# Patient Record
Sex: MALE | Race: ASIAN | NOT HISPANIC OR LATINO
[De-identification: names, ages, dates, MRNs, and addresses within clinical notes are randomized per-mention and may not be internally consistent; named-entity substitution may affect disease eponyms.]

---

## 2024-08-16 ENCOUNTER — RESULT REVIEW (OUTPATIENT)
Age: 64
End: 2024-08-16

## 2024-08-16 VITALS — WEIGHT: 149.91 LBS | BODY MASS INDEX: 23.53 KG/M2 | HEIGHT: 67.01 IN

## 2024-08-16 DIAGNOSIS — Z86.79 PERSONAL HISTORY OF OTHER DISEASES OF THE CIRCULATORY SYSTEM: ICD-10-CM

## 2024-08-16 DIAGNOSIS — I50.23 ACUTE ON CHRONIC SYSTOLIC (CONGESTIVE) HEART FAILURE: ICD-10-CM

## 2024-08-16 DIAGNOSIS — Z87.891 PERSONAL HISTORY OF NICOTINE DEPENDENCE: ICD-10-CM

## 2024-08-16 PROBLEM — Z00.00 ENCOUNTER FOR PREVENTIVE HEALTH EXAMINATION: Status: ACTIVE | Noted: 2024-08-16

## 2024-08-16 RX ORDER — LOSARTAN POTASSIUM 25 MG/1
25 TABLET, FILM COATED ORAL
Refills: 0 | Status: ACTIVE | COMMUNITY

## 2024-08-16 RX ORDER — FUROSEMIDE 20 MG/1
20 TABLET ORAL
Refills: 0 | Status: ACTIVE | COMMUNITY

## 2024-08-19 ENCOUNTER — NON-APPOINTMENT (OUTPATIENT)
Age: 64
End: 2024-08-19

## 2024-08-20 ENCOUNTER — APPOINTMENT (OUTPATIENT)
Dept: CARDIOTHORACIC SURGERY | Facility: CLINIC | Age: 64
End: 2024-08-20
Payer: MEDICARE

## 2024-08-20 ENCOUNTER — NON-APPOINTMENT (OUTPATIENT)
Age: 64
End: 2024-08-20

## 2024-08-20 ENCOUNTER — RESULT REVIEW (OUTPATIENT)
Age: 64
End: 2024-08-20

## 2024-08-20 VITALS
HEART RATE: 67 BPM | DIASTOLIC BLOOD PRESSURE: 81 MMHG | BODY MASS INDEX: 23.86 KG/M2 | HEIGHT: 67 IN | SYSTOLIC BLOOD PRESSURE: 114 MMHG | WEIGHT: 152 LBS | TEMPERATURE: 97.2 F | OXYGEN SATURATION: 98 %

## 2024-08-20 DIAGNOSIS — I34.0 NONRHEUMATIC MITRAL (VALVE) INSUFFICIENCY: ICD-10-CM

## 2024-08-20 PROCEDURE — 36415 COLL VENOUS BLD VENIPUNCTURE: CPT

## 2024-08-20 PROCEDURE — 99205 OFFICE O/P NEW HI 60 MIN: CPT

## 2024-08-20 RX ORDER — METOPROLOL TARTRATE 25 MG/1
25 TABLET, FILM COATED ORAL DAILY
Qty: 30 | Refills: 0 | Status: ACTIVE | COMMUNITY
Start: 1900-01-01 | End: 1900-01-01

## 2024-08-20 RX ORDER — MULTIVIT-MIN/FOLIC/VIT K/LYCOP 400-300MCG
500 TABLET ORAL
Qty: 4 | Refills: 0 | Status: ACTIVE | COMMUNITY
Start: 1900-01-01 | End: 1900-01-01

## 2024-08-20 NOTE — REVIEW OF SYSTEMS
[Feeling Tired] : feeling tired [Palpitations] : palpitations [SOB on Exertion] : shortness of breath during exertion [Negative] : Heme/Lymph

## 2024-08-21 NOTE — PHYSICAL EXAM
[General Appearance - Alert] : alert [General Appearance - In No Acute Distress] : in no acute distress [Sclera] : the sclera and conjunctiva were normal [PERRL With Normal Accommodation] : pupils were equal in size, round, and reactive to light [Extraocular Movements] : extraocular movements were intact [Outer Ear] : the ears and nose were normal in appearance [Oropharynx] : the oropharynx was normal [Neck Appearance] : the appearance of the neck was normal [Neck Cervical Mass (___cm)] : no neck mass was observed [Jugular Venous Distention Increased] : there was no jugular-venous distention [Thyroid Diffuse Enlargement] : the thyroid was not enlarged [Thyroid Nodule] : there were no palpable thyroid nodules [Auscultation Breath Sounds / Voice Sounds] : lungs were clear to auscultation bilaterally [Heart Rate And Rhythm] : heart rate was normal and rhythm regular [Examination Of The Chest] : the chest was normal in appearance [Chest Visual Inspection Thoracic Asymmetry] : no chest asymmetry [Diminished Respiratory Excursion] : normal chest expansion [2+] : left 2+ [1+] : left 1+ [No Abnormalities] : the abdominal aorta was not enlarged and no bruit was heard [Abdomen Soft] : soft [Bowel Sounds] : normal bowel sounds [Abdomen Tenderness] : non-tender [Abdomen Mass (___ Cm)] : no abdominal mass palpated [No CVA Tenderness] : no ~M costovertebral angle tenderness [No Spinal Tenderness] : no spinal tenderness [Abnormal Walk] : normal gait [Nail Clubbing] : no clubbing  or cyanosis of the fingernails [Musculoskeletal - Swelling] : no joint swelling seen [Motor Tone] : muscle strength and tone were normal [Skin Color & Pigmentation] : normal skin color and pigmentation [Skin Turgor] : normal skin turgor [] : no rash [Deep Tendon Reflexes (DTR)] : deep tendon reflexes were 2+ and symmetric [Sensation] : the sensory exam was normal to light touch and pinprick [No Focal Deficits] : no focal deficits [Oriented To Time, Place, And Person] : oriented to person, place, and time [Impaired Insight] : insight and judgment were intact [Affect] : the affect was normal [FreeTextEntry1] : 3/6  @ apex [Right Carotid Bruit] : no bruit heard over the right carotid [Left Carotid Bruit] : no bruit heard over the left carotid [Right Femoral Bruit] : no bruit heard over the right femoral artery [Left Femoral Bruit] : no bruit heard over the left femoral artery

## 2024-08-21 NOTE — ASSESSMENT
[FreeTextEntry1] : 63 y/o German speaking male, former smoker(30 pack/year history, quit 8/13/24) and PMHx of HTN, HFrEF (EF 43%), and mitral valve prolapse presents with severe mitral valve regurgitation. Dr. Kelsey reviewed the cardiac cath images and echocardiogram images with the patient and his son and discussed the case with Dr. Goldberg and Dr. Donnelly. Dr. Kelsey performed the STS risk calculator and quoted a2-3% operative mortality and complication risk and discussed the STS risk factors with the patient including but not limited to death, heart attack, bleeding, stroke, kidney problems and infection. Dr. Kelsey also discussed surgical approaches, minimally invasive versus sternotomy.   Dr. Kelsey feels the patient will benefit from and is a candidate for robotic, minimally invasive, mitral valve repair. All questions were addressed and the patient agrees to proceed with surgery.   Plan:  PST--labs, xray, u/a Blood drawn in office and reviewed in sunrise EKG performed in office and results scanned into [employer] SDA 8/29 ascorbic acid 2gm at bedtime on 8/28 16oz Gatorade the morning of surgery, completing by 5:30am patient instructed to take Metoprolol on morning of surgery instructions provided re antibacterial showers and pt given 3 sponges pt instructed on use of the incentive spirometer and demonstrated use to 1750cc

## 2024-08-21 NOTE — ASSESSMENT
[FreeTextEntry1] : 63 y/o Hungarian speaking male, former smoker(30 pack/year history, quit 8/13/24) and PMHx of HTN, HFrEF (EF 43%), and mitral valve prolapse presents with severe mitral valve regurgitation. Dr. Kelsey reviewed the cardiac cath images and echocardiogram images with the patient and his son and discussed the case with Dr. Goldberg and Dr. Donnelly. Dr. Kelsey performed the STS risk calculator and quoted a2-3% operative mortality and complication risk and discussed the STS risk factors with the patient including but not limited to death, heart attack, bleeding, stroke, kidney problems and infection. Dr. Kelsey also discussed surgical approaches, minimally invasive versus sternotomy.   Dr. Kelsey feels the patient will benefit from and is a candidate for robotic, minimally invasive, mitral valve repair. All questions were addressed and the patient agrees to proceed with surgery.   Plan:  PST--labs, xray, u/a Blood drawn in office and reviewed in sunrise EKG performed in office and results scanned into [employer] SDA 8/29 ascorbic acid 2gm at bedtime on 8/28 16oz Gatorade the morning of surgery, completing by 5:30am patient instructed to take Metoprolol on morning of surgery instructions provided re antibacterial showers and pt given 3 sponges pt instructed on use of the incentive spirometer and demonstrated use to 1750cc

## 2024-08-21 NOTE — HISTORY OF PRESENT ILLNESS
[FreeTextEntry1] : 63 y/o Persian speaking male, former smoker(30 pack/year history, quit 8/13/24) and PMHx of HTN, HFrEF (EF 43%), and mitral valve prolapse presents with severe mitral valve regurgitation. Patient presented to cardiologist, Dr. Arnol Donnelly with c/o chest pain when ambulating more than 1-2 city blocks. He also endorsed DUBOSE that limits his exercise tolerance, and b/l LE edema.  NYHA 3. He was recently seen at [facility] due to SOB, found to have CHF and ECG consistent with old MI (denies hospitalization history). He was started on Lasix 20 mg BID with some relief of symptoms. Event monitor (7/2024): NSR with PVCs. TTE (7/30/24): LVEF 43%, LV cavity is normal in size, mild decrease in global wall motion, No WMA, left atrial cavity is severely dilated, e-wave dominant mitral inflow, mod-severe MR.  8/16/24 s/p LHC that revealed normal coronary arteries, LVEDP 20mmHg. s/p TTE/JEANNA that revealed decreased LVEF, mitral valve prolapse of the posterior leaflet with likely flail P2 scallop with severe MR.   Patient presents today for surgical consult with Dr. Kelsey accompanied by his son. He appears generally well, NAD.

## 2024-08-21 NOTE — PHYSICAL EXAM
[General Appearance - Alert] : alert [General Appearance - In No Acute Distress] : in no acute distress [Sclera] : the sclera and conjunctiva were normal [PERRL With Normal Accommodation] : pupils were equal in size, round, and reactive to light [Extraocular Movements] : extraocular movements were intact [Outer Ear] : the ears and nose were normal in appearance [Oropharynx] : the oropharynx was normal [Neck Appearance] : the appearance of the neck was normal [Neck Cervical Mass (___cm)] : no neck mass was observed [Jugular Venous Distention Increased] : there was no jugular-venous distention [Thyroid Diffuse Enlargement] : the thyroid was not enlarged [Thyroid Nodule] : there were no palpable thyroid nodules [Auscultation Breath Sounds / Voice Sounds] : lungs were clear to auscultation bilaterally [Heart Rate And Rhythm] : heart rate was normal and rhythm regular [Examination Of The Chest] : the chest was normal in appearance [Chest Visual Inspection Thoracic Asymmetry] : no chest asymmetry [Diminished Respiratory Excursion] : normal chest expansion [2+] : left 2+ [1+] : left 1+ [No Abnormalities] : the abdominal aorta was not enlarged and no bruit was heard [Bowel Sounds] : normal bowel sounds [Abdomen Soft] : soft [Abdomen Tenderness] : non-tender [Abdomen Mass (___ Cm)] : no abdominal mass palpated [No CVA Tenderness] : no ~M costovertebral angle tenderness [No Spinal Tenderness] : no spinal tenderness [Nail Clubbing] : no clubbing  or cyanosis of the fingernails [Abnormal Walk] : normal gait [Musculoskeletal - Swelling] : no joint swelling seen [Motor Tone] : muscle strength and tone were normal [Skin Color & Pigmentation] : normal skin color and pigmentation [Skin Turgor] : normal skin turgor [] : no rash [Deep Tendon Reflexes (DTR)] : deep tendon reflexes were 2+ and symmetric [Sensation] : the sensory exam was normal to light touch and pinprick [No Focal Deficits] : no focal deficits [Oriented To Time, Place, And Person] : oriented to person, place, and time [Impaired Insight] : insight and judgment were intact [Affect] : the affect was normal [FreeTextEntry1] : 3/6  @ apex [Right Carotid Bruit] : no bruit heard over the right carotid [Left Carotid Bruit] : no bruit heard over the left carotid [Right Femoral Bruit] : no bruit heard over the right femoral artery [Left Femoral Bruit] : no bruit heard over the left femoral artery

## 2024-08-21 NOTE — END OF VISIT
[Time Spent: ___ minutes] : I have spent [unfilled] minutes of time on the encounter. [FreeTextEntry3] :   I, Dr. LOPEZ [facility], personally performed the evaluation and management (E/M) services for this new patient.  That E/M includes conducting the clinically appropriate initial history &/or exam, assessing all conditions, and establishing the plan of care.  Today, my AKANKSHA, was here to observe &/or participate in the visit & follow plan of care established by me.

## 2024-08-21 NOTE — REASON FOR VISIT
[Consultation] : a consultation visit [Family Member] : family member [Other: ______] : provided by LYDIA [Interpreters_IDNumber] : [id number] [Interpreters_FullName] : Nette

## 2024-08-21 NOTE — HISTORY OF PRESENT ILLNESS
[FreeTextEntry1] : 63 y/o Mohawk speaking male, former smoker(30 pack/year history, quit 8/13/24) and PMHx of HTN, HFrEF (EF 43%), and mitral valve prolapse presents with severe mitral valve regurgitation. Patient presented to cardiologist, Dr. Arnol Donnelly with c/o chest pain when ambulating more than 1-2 city blocks. He also endorsed DUBOSE that limits his exercise tolerance, and b/l LE edema.  NYHA 3. He was recently seen at [facility] due to SOB, found to have CHF and ECG consistent with old MI (denies hospitalization history). He was started on Lasix 20 mg BID with some relief of symptoms. Event monitor (7/2024): NSR with PVCs. TTE (7/30/24): LVEF 43%, LV cavity is normal in size, mild decrease in global wall motion, No WMA, left atrial cavity is severely dilated, e-wave dominant mitral inflow, mod-severe MR.  8/16/24 s/p LHC that revealed normal coronary arteries, LVEDP 20mmHg. s/p TTE/JEANNA that revealed decreased LVEF, mitral valve prolapse of the posterior leaflet with likely flail P2 scallop with severe MR.   Patient presents today for surgical consult with Dr. Kelsey accompanied by his son. He appears generally well, NAD.

## 2024-08-21 NOTE — HISTORY OF PRESENT ILLNESS
[FreeTextEntry1] : 63 y/o Italian speaking male, former smoker(30 pack/year history, quit 8/13/24) and PMHx of HTN, HFrEF (EF 43%), and mitral valve prolapse presents with severe mitral valve regurgitation. Patient presented to cardiologist, Dr. Arnol Donnelly with c/o chest pain when ambulating more than 1-2 city blocks. He also endorsed DUBOSE that limits his exercise tolerance, and b/l LE edema.  NYHA 3. He was recently seen at [facility] due to SOB, found to have CHF and ECG consistent with old MI (denies hospitalization history). He was started on Lasix 20 mg BID with some relief of symptoms. Event monitor (7/2024): NSR with PVCs. TTE (7/30/24): LVEF 43%, LV cavity is normal in size, mild decrease in global wall motion, No WMA, left atrial cavity is severely dilated, e-wave dominant mitral inflow, mod-severe MR.  8/16/24 s/p LHC that revealed normal coronary arteries, LVEDP 20mmHg. s/p TTE/JEANNA that revealed decreased LVEF, mitral valve prolapse of the posterior leaflet with likely flail P2 scallop with severe MR.   Patient presents today for surgical consult with Dr. Kelsey accompanied by his son. He appears generally well, NAD.

## 2024-08-21 NOTE — ASSESSMENT
[FreeTextEntry1] : 63 y/o Kinyarwanda speaking male, former smoker(30 pack/year history, quit 8/13/24) and PMHx of HTN, HFrEF (EF 43%), and mitral valve prolapse presents with severe mitral valve regurgitation. Dr. Kelsey reviewed the cardiac cath images and echocardiogram images with the patient and his son and discussed the case with Dr. Goldberg and Dr. Donnelly. Dr. Kelsey performed the STS risk calculator and quoted a2-3% operative mortality and complication risk and discussed the STS risk factors with the patient including but not limited to death, heart attack, bleeding, stroke, kidney problems and infection. Dr. Kelsey also discussed surgical approaches, minimally invasive versus sternotomy.   Dr. Kelsey feels the patient will benefit from and is a candidate for robotic, minimally invasive, mitral valve repair. All questions were addressed and the patient agrees to proceed with surgery.   Plan:  PST--labs, xray, u/a Blood drawn in office and reviewed in sunrise EKG performed in office and results scanned into [employer] S[employer] 8/29 ascorbic acid 2gm at bedtime on 8/28 16oz Gatorade the morning of surgery, completing by 5:30am patient instructed to take Metoprolol on morning of surgery instructions provided re antibacterial showers and pt given 3 sponges pt instructed on use of the incentive spirometer and demonstrated use to 1750cc

## 2024-08-21 NOTE — DATA REVIEWED
[FreeTextEntry1] : 8/16/24 TTE: 1. Limited study obtained for evaluation of mitral regurgitation. 2. Moderately dilated left ventricular size. 3. Mildly reduced left ventricular systolic function. 4. Dilated right ventricular size. 5. Reduced right ventricular systolic function. 6. Biatrial enlargement. 7. Severe eccentric mitral regurgitation. Mitral valve prolapse of the posterior leaflet with likely  flail P2 scallop.  8. No pericardial effusion. 9. No prior echo is available for comparison  8/16/24 LHC:  normal coronary arteries LVEDP 20mmHg

## 2024-08-21 NOTE — END OF VISIT
[Time Spent: ___ minutes] : I have spent [unfilled] minutes of time on the encounter. [FreeTextEntry3] :   I, Dr. LOPEZ [facility], personally performed the evaluation and management (E/M) services for this new patient.  That E/M includes conducting the clinically appropriate initial history &/or exam, assessing all conditions, and establishing the plan of care.  Today, my AKANKSHA, was here to observe &/or participate in the visit & follow plan of care established by me.

## 2024-08-21 NOTE — END OF VISIT
[Time Spent: ___ minutes] : I have spent [unfilled] minutes of time on the encounter. [FreeTextEntry3] :   I, Dr. LOPEZ [facility], personally performed the evaluation and management (E/M) services for this new patient.  That E/M includes conducting the clinically appropriate initial history &/or exam, assessing all conditions, and establishing the plan of care.  Today, my AKANKSHA, was here to observe &/or participate in the visit & follow plan of care established by me.

## 2024-08-21 NOTE — PHYSICAL EXAM
[General Appearance - Alert] : alert [General Appearance - In No Acute Distress] : in no acute distress [Sclera] : the sclera and conjunctiva were normal [PERRL With Normal Accommodation] : pupils were equal in size, round, and reactive to light [Extraocular Movements] : extraocular movements were intact [Outer Ear] : the ears and nose were normal in appearance [Oropharynx] : the oropharynx was normal [Neck Appearance] : the appearance of the neck was normal [Neck Cervical Mass (___cm)] : no neck mass was observed [Jugular Venous Distention Increased] : there was no jugular-venous distention [Thyroid Diffuse Enlargement] : the thyroid was not enlarged [Thyroid Nodule] : there were no palpable thyroid nodules [Auscultation Breath Sounds / Voice Sounds] : lungs were clear to auscultation bilaterally [Heart Rate And Rhythm] : heart rate was normal and rhythm regular [Examination Of The Chest] : the chest was normal in appearance [Chest Visual Inspection Thoracic Asymmetry] : no chest asymmetry [Diminished Respiratory Excursion] : normal chest expansion [2+] : left 2+ [1+] : left 1+ [No Abnormalities] : the abdominal aorta was not enlarged and no bruit was heard [Abdomen Soft] : soft [Bowel Sounds] : normal bowel sounds [Abdomen Tenderness] : non-tender [Abdomen Mass (___ Cm)] : no abdominal mass palpated [No CVA Tenderness] : no ~M costovertebral angle tenderness [No Spinal Tenderness] : no spinal tenderness [Nail Clubbing] : no clubbing  or cyanosis of the fingernails [Abnormal Walk] : normal gait [Musculoskeletal - Swelling] : no joint swelling seen [Motor Tone] : muscle strength and tone were normal [Skin Color & Pigmentation] : normal skin color and pigmentation [Skin Turgor] : normal skin turgor [] : no rash [Deep Tendon Reflexes (DTR)] : deep tendon reflexes were 2+ and symmetric [Sensation] : the sensory exam was normal to light touch and pinprick [No Focal Deficits] : no focal deficits [Oriented To Time, Place, And Person] : oriented to person, place, and time [Impaired Insight] : insight and judgment were intact [Affect] : the affect was normal [FreeTextEntry1] : 3/6  @ apex [Right Carotid Bruit] : no bruit heard over the right carotid [Left Carotid Bruit] : no bruit heard over the left carotid [Right Femoral Bruit] : no bruit heard over the right femoral artery [Left Femoral Bruit] : no bruit heard over the left femoral artery

## 2024-08-21 NOTE — REASON FOR VISIT
[Consultation] : a consultation visit [Family Member] : family member [Other: ______] : provided by LYDIA [Interpreters_IDNumber] : [id number] [Interpreters_FullName] : Nette

## 2024-08-21 NOTE — REASON FOR VISIT
[Consultation] : a consultation visit [Family Member] : family member [Other: ______] : provided by LYDIA [Interpreters_IDNumber] : [id number] [Interpreters_FullName] : Nette

## 2024-08-22 ENCOUNTER — NON-APPOINTMENT (OUTPATIENT)
Age: 64
End: 2024-08-22

## 2024-08-28 ENCOUNTER — TRANSCRIPTION ENCOUNTER (OUTPATIENT)
Age: 64
End: 2024-08-28

## 2024-08-29 ENCOUNTER — RESULT REVIEW (OUTPATIENT)
Age: 64
End: 2024-08-29

## 2024-08-29 ENCOUNTER — APPOINTMENT (OUTPATIENT)
Dept: CARDIOTHORACIC SURGERY | Facility: HOSPITAL | Age: 64
End: 2024-08-29
Payer: MEDICARE

## 2024-08-29 DIAGNOSIS — Z98.890 OTHER SPECIFIED POSTPROCEDURAL STATES: ICD-10-CM

## 2024-08-29 DIAGNOSIS — Z09 ENCOUNTER FOR FOLLOW-UP EXAMINATION AFTER COMPLETED TREATMENT FOR CONDITIONS OTHER THAN MALIGNANT NEOPLASM: ICD-10-CM

## 2024-08-29 PROCEDURE — ZZZZZ: CPT

## 2024-08-31 ENCOUNTER — RESULT REVIEW (OUTPATIENT)
Age: 64
End: 2024-08-31

## 2024-09-03 ENCOUNTER — RESULT REVIEW (OUTPATIENT)
Age: 64
End: 2024-09-03

## 2024-09-04 ENCOUNTER — TRANSCRIPTION ENCOUNTER (OUTPATIENT)
Age: 64
End: 2024-09-04

## 2024-09-04 PROBLEM — Z09 POSTOP CHECK: Status: ACTIVE | Noted: 2024-09-04

## 2024-09-04 PROBLEM — Z98.890 S/P MITRAL VALVE REPAIR: Status: ACTIVE | Noted: 2024-09-04

## 2024-09-04 PROBLEM — I50.22 CHRONIC SYSTOLIC HEART FAILURE: Status: ACTIVE | Noted: 2024-09-04

## 2024-09-04 RX ORDER — ASPIRIN 81 MG
81 TABLET, DELAYED RELEASE (ENTERIC COATED) ORAL DAILY
Qty: 30 | Refills: 6 | Status: ACTIVE | COMMUNITY

## 2024-09-04 RX ORDER — AMIODARONE HYDROCHLORIDE 200 MG/1
200 TABLET ORAL DAILY
Qty: 30 | Refills: 0 | Status: ACTIVE | COMMUNITY

## 2024-09-04 RX ORDER — PANTOPRAZOLE 40 MG/1
40 TABLET, DELAYED RELEASE ORAL DAILY
Qty: 30 | Refills: 0 | Status: ACTIVE | COMMUNITY

## 2024-09-04 RX ORDER — OXYCODONE 5 MG/1
5 TABLET ORAL
Refills: 0 | Status: ACTIVE | COMMUNITY

## 2024-09-04 RX ORDER — SACUBITRIL AND VALSARTAN 49; 51 MG/1; MG/1
49-51 TABLET, FILM COATED ORAL TWICE DAILY
Refills: 0 | Status: ACTIVE | COMMUNITY

## 2024-09-04 RX ORDER — POTASSIUM CHLORIDE 1500 MG/1
20 TABLET, FILM COATED, EXTENDED RELEASE ORAL
Qty: 7 | Refills: 1 | Status: ACTIVE | COMMUNITY

## 2024-09-04 NOTE — REASON FOR VISIT
[de-identified] : Robotic, MV repair w/band, EF 30% [de-identified] : 8/29/24 [de-identified] : Intraop uncomplicated. Transferred to ICU intubated on dobutamine, primacor and levo. POD 1 extubated. levo off, remains on dobut/primacor. POD 2 in Afib, started on amiodarone and converted to SR. POD 2. dobut weaned off. POD 4, primacor off, entresto started. 9/3 echo: mild MR, EF 35-40%. Patient discharged home on 9/4.  chest xray XX

## 2024-09-05 ENCOUNTER — NON-APPOINTMENT (OUTPATIENT)
Age: 64
End: 2024-09-05

## 2024-09-05 ENCOUNTER — RX RENEWAL (OUTPATIENT)
Age: 64
End: 2024-09-05

## 2024-09-06 ENCOUNTER — APPOINTMENT (OUTPATIENT)
Dept: CARE COORDINATION | Facility: HOME HEALTH | Age: 64
End: 2024-09-06

## 2024-09-06 PROCEDURE — 99024 POSTOP FOLLOW-UP VISIT: CPT

## 2024-09-10 ENCOUNTER — APPOINTMENT (OUTPATIENT)
Dept: CARDIOTHORACIC SURGERY | Facility: CLINIC | Age: 64
End: 2024-09-10
Payer: MEDICARE

## 2024-09-10 ENCOUNTER — NON-APPOINTMENT (OUTPATIENT)
Age: 64
End: 2024-09-10

## 2024-09-10 ENCOUNTER — RESULT REVIEW (OUTPATIENT)
Age: 64
End: 2024-09-10

## 2024-09-10 VITALS
HEIGHT: 67 IN | WEIGHT: 148 LBS | OXYGEN SATURATION: 96 % | HEART RATE: 82 BPM | BODY MASS INDEX: 23.23 KG/M2 | TEMPERATURE: 97.2 F | SYSTOLIC BLOOD PRESSURE: 127 MMHG | DIASTOLIC BLOOD PRESSURE: 74 MMHG

## 2024-09-10 DIAGNOSIS — Z98.890 OTHER SPECIFIED POSTPROCEDURAL STATES: ICD-10-CM

## 2024-09-10 DIAGNOSIS — I50.22 CHRONIC SYSTOLIC (CONGESTIVE) HEART FAILURE: ICD-10-CM

## 2024-09-10 DIAGNOSIS — Z09 ENCOUNTER FOR FOLLOW-UP EXAMINATION AFTER COMPLETED TREATMENT FOR CONDITIONS OTHER THAN MALIGNANT NEOPLASM: ICD-10-CM

## 2024-09-10 PROCEDURE — 99024 POSTOP FOLLOW-UP VISIT: CPT

## 2024-09-12 NOTE — END OF VISIT
[FreeTextEntry3] : I, Dr. LOPEZ [facility], personally performed the evaluation and management (E/M) services for this established patient who presents today with (a) new problem(s)/exacerbation of (an) existing condition(s).  That E/M includes conducting the clinically appropriate interval history &/or exam, assessing all new/exacerbated conditions, and establishing a new plan of care.  Today, my AKANKSHA, was here to observe &/or participate in the visit & follow plan of care established by me.

## 2024-09-12 NOTE — REASON FOR VISIT
[Family Member] : family member [de-identified] : Robotic, MV repair w/band, EF 30% [de-identified] : 8/29/24 [de-identified] : Intraop uncomplicated. Transferred to ICU intubated on dobutamine, primacor and levo. POD 1 extubated. levo off, remains on dobut/primacor. POD 2 in Afib, started on amiodarone and converted to SR. POD 2. dobut weaned off. POD 4, primacor off, entresto started. 9/3 echo: mild MR, EF 35-40%. Patient discharged home on 9/4.  Patient presents for postop visit accompanied by his son. He appears well, NAD. He denies c/o chest pain , sob/owen, fever, lower extremity edema, syncope or palpitations. He continues to increase activity with walking.  chest xray today wnl, no effusion

## 2024-09-12 NOTE — PHYSICAL EXAM
[] : no respiratory distress [Auscultation Breath Sounds / Voice Sounds] : lungs were clear to auscultation bilaterally [Heart Rate And Rhythm] : heart rate was normal and rhythm regular [Heart Sounds] : normal S1 and S2 [Murmurs] : no murmurs [Heart Sounds Gallop] : no gallops [Heart Sounds Pericardial Friction Rub] : no pericardial rub [Clean] : clean [Dry] : dry [Healing Well] : healing well [No Edema] : no edema [FreeTextEntry2] : right ant port sites with resolving ecchymosis [FreeTextEntry3] : right femoral cannulation incision hematuria/back pain general

## 2024-09-12 NOTE — END OF VISIT
[FreeTextEntry3] : I, Dr. LOPEZ [facility], personally performed the evaluation and management (E/M) services for this established patient who presents today with (a) new problem(s)/exacerbation of (an) existing condition(s).  That E/M includes conducting the clinically appropriate interval history &/or exam, assessing all new/exacerbated conditions, and establishing a new plan of care.  Today, my AKANKSHA, was here to observe &/or participate in the visit & follow plan of care established by me.

## 2024-09-12 NOTE — PHYSICAL EXAM
[] : no respiratory distress [Auscultation Breath Sounds / Voice Sounds] : lungs were clear to auscultation bilaterally [Heart Rate And Rhythm] : heart rate was normal and rhythm regular [Heart Sounds] : normal S1 and S2 [Heart Sounds Gallop] : no gallops [Murmurs] : no murmurs [Heart Sounds Pericardial Friction Rub] : no pericardial rub [Clean] : clean [Dry] : dry [Healing Well] : healing well [No Edema] : no edema [FreeTextEntry2] : right ant port sites with resolving ecchymosis [FreeTextEntry3] : right femoral cannulation incision

## 2024-09-12 NOTE — END OF VISIT
[FreeTextEntry3] : I, Dr. LOPEZ [facility], personally performed the evaluation and management (E/M) services for this established patient who presents today with (a) new problem(s)/exacerbation of (an) existing condition(s).  That E/M includes conducting the clinically appropriate interval history &/or exam, assessing all new/exacerbated conditions, and establishing a new plan of care.  Today, my AKANKSHA, was here to observe &/or participate in the visit & follow plan of care established by me.

## 2024-09-12 NOTE — REASON FOR VISIT
[Family Member] : family member [de-identified] : Robotic, MV repair w/band, EF 30% [de-identified] : 8/29/24 [de-identified] : Intraop uncomplicated. Transferred to ICU intubated on dobutamine, primacor and levo. POD 1 extubated. levo off, remains on dobut/primacor. POD 2 in Afib, started on amiodarone and converted to SR. POD 2. dobut weaned off. POD 4, primacor off, entresto started. 9/3 echo: mild MR, EF 35-40%. Patient discharged home on 9/4.  Patient presents for postop visit accompanied by his son. He appears well, NAD. He denies c/o chest pain , sob/owen, fever, lower extremity edema, syncope or palpitations. He continues to increase activity with walking.  chest xray today wnl, no effusion

## 2024-09-12 NOTE — DISCUSSION/SUMMARY
[Doing Well] : is doing well [1] : 1 [FreeTextEntry1] : Plan:  Continue current medication regimen. Continue to increase activity and walk daily as tolerated. Continue to use incentive spirometer.  No driving or strenuous activity for 4 weeks after surgery. Avoid lifting >10 to 15 lbs. Call MD if you experience fever, fatigue, dizziness, confusion, syncope, shortness of breath, chest pain not relieved with analgesics, increased redness/drainage from incision. Follow up with Dr. Donnelly stop amiodarone today decrease lasix to 20mg daily and kdur 10meq daily counseled re need for antibiotics prior to dental and surgical procedures DC from CTS service.

## 2024-09-12 NOTE — REASON FOR VISIT
[Family Member] : family member [de-identified] : Robotic, MV repair w/band, EF 30% [de-identified] : 8/29/24 [de-identified] : Intraop uncomplicated. Transferred to ICU intubated on dobutamine, primacor and levo. POD 1 extubated. levo off, remains on dobut/primacor. POD 2 in Afib, started on amiodarone and converted to SR. POD 2. dobut weaned off. POD 4, primacor off, entresto started. 9/3 echo: mild MR, EF 35-40%. Patient discharged home on 9/4.  Patient presents for postop visit accompanied by his son. He appears well, NAD. He denies c/o chest pain , sob/owen, fever, lower extremity edema, syncope or palpitations. He continues to increase activity with walking.  chest xray today wnl, no effusion

## 2024-09-13 VITALS
WEIGHT: 150 LBS | BODY MASS INDEX: 23.49 KG/M2 | HEART RATE: 82 BPM | SYSTOLIC BLOOD PRESSURE: 126 MMHG | OXYGEN SATURATION: 96 % | RESPIRATION RATE: 18 BRPM | DIASTOLIC BLOOD PRESSURE: 78 MMHG

## 2024-09-13 NOTE — HISTORY OF PRESENT ILLNESS
[FreeTextEntry1] : [facility]: [facility] Post discharge follow up  Operation Date: 8/29/24: Robotic MV repair EF 30%: Primary Surgeon/Attending MD: Dr. Fidencio Kelsey     Yash Umaña is a 64 yoM, Sinhala speaking, former smoker (30 pack/year history, quit 8/13/24) and PMH of HTN, HFrEF (EF 43%), and severe MR 2/2 MVP who presented to [facility] for surgical intervention. On 8/29/24 he underwent an uncomplicated robotic MV  repair, EF 30% with Dr. Kelsey. He arrived to CTICU intubated, on dobutamine, primacor and levo gtts. Discharge to home on 9/4/2024.  Seen by [facility] for post discharge follow-up, in NAD, ambulating independently. Denies chest pain, SOB/DUBOSE.

## 2024-09-13 NOTE — REASON FOR VISIT
[Follow-Up: _____] : a [unfilled] follow-up visit [Family Member] : family member [[employer] ] : provided by [employer]   [Interpreters_IDNumber] : [id number] [FreeTextEntry3] : [employer] Shantell Umaña (Son) [TWNoteComboBox1] : Polish

## 2024-09-13 NOTE — REASON FOR VISIT
[Follow-Up: _____] : a [unfilled] follow-up visit [Family Member] : family member [[employer] ] : provided by [employer]   [Interpreters_IDNumber] : [id number] [FreeTextEntry3] : [employer] Shantell Umaña (Son) [TWNoteComboBox1] : Latvian

## 2024-09-13 NOTE — HISTORY OF PRESENT ILLNESS
3/26/18, WORSENED . [FreeTextEntry1] : Pending sale to [facility]: [facility] Post discharge follow up  Operation Date: 8/29/24: Robotic MV repair EF 30%: Primary Surgeon/Attending MD: Dr. 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 On 8/29/24 he underwent an uncomplicated robotic MV  repair, EF 30% with Dr. Kelsey. He arrived to [facility] intubated, on dobutamine, primacor and levo gtts. Discharge to home on 9/4/2024.  Seen by [facility] for post discharge follow-up, in NAD, ambulating independently. Denies chest pain, SOB/DUBOSE.

## 2024-09-13 NOTE — PHYSICAL EXAM
[Jugular Venous Distention Increased] : there was no jugular-venous distention [] : no respiratory distress [Auscultation Breath Sounds / Voice Sounds] : lungs were clear to auscultation bilaterally [Oriented To Time, Place, And Person] : oriented to person, place, and time [Impaired Insight] : insight and judgment were intact [Affect] : the affect was normal [FreeTextEntry1] : right groin cannulation site c/d/i, soft,

## 2024-09-13 NOTE — ASSESSMENT
[FreeTextEntry1] : Mr. Umaña is recovering at home s/p Robotic Mitral Valve repair. Reviewed all medications and dosages with patient understanding. Patient has all medications in home and is taking as prescribed. Pain controlled with current medication regimen. No new symptoms, issues or concerns. Reports ambulating around home independently, without the use if assistive device. Denies chest pain, SOB/DUBOSE, nausea/vomiting, constipation/diarrhea.   PLAN OF CARE  DASH diet  -Shower let water run over incision use antibacterial soap and pat dry; avoid all creams, ointments or lotions leave open to air.   -Encourage increased ambulation to include outdoors; avoiding extreme temperatures.  -Start daily weights today; call immediately for weight gain >3lbs in a day/5lbs in a week.  -TAKE 1/2 tablet of Oxycodone 2.5mg along with Tylenol 2 tabs (650mg) at night for pain scale >5; this will provide more effective pain relief with least amount of narcotic. Can take additional Oxy 2.5mg if no relief within one hour.  [facility] team will continue to follow up with patient's status. NP/CCC roles explained with patient understanding, contact information provided. Patient agrees to call with any questions, issues or concerns. Worsening symptoms reviewed with patient with reiteration and understanding.

## 2024-10-01 ENCOUNTER — TRANSCRIPTION ENCOUNTER (OUTPATIENT)
Age: 64
End: 2024-10-01